# Patient Record
Sex: FEMALE | Race: WHITE | NOT HISPANIC OR LATINO
[De-identification: names, ages, dates, MRNs, and addresses within clinical notes are randomized per-mention and may not be internally consistent; named-entity substitution may affect disease eponyms.]

---

## 2020-09-22 ENCOUNTER — APPOINTMENT (OUTPATIENT)
Dept: SURGERY | Facility: CLINIC | Age: 55
End: 2020-09-22

## 2020-09-22 ENCOUNTER — APPOINTMENT (OUTPATIENT)
Dept: SURGERY | Facility: CLINIC | Age: 55
End: 2020-09-22
Payer: COMMERCIAL

## 2020-09-22 VITALS
HEART RATE: 74 BPM | DIASTOLIC BLOOD PRESSURE: 90 MMHG | OXYGEN SATURATION: 99 % | TEMPERATURE: 97 F | SYSTOLIC BLOOD PRESSURE: 154 MMHG

## 2020-09-22 PROCEDURE — 27337 EXC THIGH/KNEE LES SC 3 CM/>: CPT

## 2020-09-22 NOTE — PROCEDURE
[FreeTextEntry1] : preop dx left knee mass 3 by 2 cm\par Post op dx same\par procedure Excision of left knee mass\par Surgeon Dr lai\par EBL 10 cc\par Condition stable\par \par   Pt was placed in the supine position and the left knee was properly cleaned and draped. A time out was done. Next the area over the mass was infiltrated with 1 per cent lidocaine. After adequate local anesthesia was administered a 4 cm incision was made over the mass. The mass was than dissected out with sharp dissection.  After it was removed, the area was irrigated with NS and hemostasis was obtained with electrocautery.  The deep subcutaneous tissue was reapproximated with 3-0 vicryl sutures and the skin incision was closed with a 4-0 Biosyn suture.  A DSD was than applied.  Pt tolerated the procedure and left the office in stable condition.

## 2020-09-29 ENCOUNTER — APPOINTMENT (OUTPATIENT)
Dept: SURGERY | Facility: CLINIC | Age: 55
End: 2020-09-29
Payer: COMMERCIAL

## 2020-09-29 VITALS — TEMPERATURE: 98 F

## 2020-09-29 DIAGNOSIS — R22.42 LOCALIZED SWELLING, MASS AND LUMP, LEFT LOWER LIMB: ICD-10-CM

## 2020-09-29 PROCEDURE — 99024 POSTOP FOLLOW-UP VISIT: CPT

## 2020-09-29 NOTE — PHYSICAL EXAM
[Normal Breath Sounds] : Normal breath sounds [Normal Heart Sounds] : normal heart sounds [Calm] : calm [de-identified] : well developed female in no acute distress [de-identified] : left knee incision clean and closed

## 2020-09-29 NOTE — HISTORY OF PRESENT ILLNESS
[de-identified] : s/p excision of left knee mass 9/22/20 [de-identified] : pt without complaints, no drainage.  No fevers, chills or night sweats.

## 2020-10-06 LAB — CORE LAB BIOPSY: NORMAL

## 2020-10-27 ENCOUNTER — APPOINTMENT (OUTPATIENT)
Dept: SURGERY | Facility: CLINIC | Age: 55
End: 2020-10-27

## 2022-07-15 ENCOUNTER — APPOINTMENT (OUTPATIENT)
Dept: ORTHOPEDIC SURGERY | Facility: CLINIC | Age: 57
End: 2022-07-15

## 2022-07-15 ENCOUNTER — TRANSCRIPTION ENCOUNTER (OUTPATIENT)
Age: 57
End: 2022-07-15

## 2022-07-15 VITALS — HEIGHT: 66 IN | WEIGHT: 130 LBS | BODY MASS INDEX: 20.89 KG/M2

## 2022-07-15 PROCEDURE — 99204 OFFICE O/P NEW MOD 45 MIN: CPT

## 2022-07-15 PROCEDURE — 99203 OFFICE O/P NEW LOW 30 MIN: CPT

## 2022-07-15 NOTE — IMAGING
[de-identified] : Constitutional: well developed and well nourished, able to communicate\par Cardiovascular: Peripheral vascular exam is grossly normal\par Neurologic: Alert and oriented, no acute distress.\par Skin: normal skin with no ulcers, rashes, or lesions\par Pulmonary: No respiratory distress, breathing comfortably on room air\par Lymphatics: No obvious lymphadenopathy or lymphedema in areas examined\par \par LOWER EXTREMITY/ RIGHT HIP EXAM\par Standing pelvic alignment: Symmetric with no Trendelenburg\par Atrophy: none\par Ecchymosis/swelling: none\par \par Range of Motion\par Hip: Flexion/extension/ER/IR     / EX 20/ ER 45/ IR 20 / AB 60 /AD 20\par Impingement with flexion adduction and internal rotation: POS\par Contracture: none\par Snapping hip: negative\par Greater trochanter: no tenderness\par \par Neurovascular\par Distal extremities: warm to touch\par Sensation to light touch: intact\par Muscle strength: 5/5\par \par IMAGING:\par 07/15/2022 Xrays of the Right hip 3v were taken demonstrating no signs of fractures, dislocations,or significant arthritis. \par MRI\par

## 2022-07-15 NOTE — HISTORY OF PRESENT ILLNESS
[Gradual] : gradual [7] : 7 [8] : 8 [Sharp] : sharp [Shooting] : shooting [Constant] : constant [Meds] : meds [Injection therapy] : injection therapy [Lying in bed] : lying in bed [de-identified] : Ms. VIV MCCLURE is a 57 year female that comes in today with a chief complaint of right hip pain with injection into the right hip CSI June 17 2022 with relief for short period of time. PT no relief.\par \par + groin pain. [] : no [FreeTextEntry1] : right hip  [FreeTextEntry3] : 5/21 /22 [FreeTextEntry5] : unknown injury . pt states shes been having pain. Is very active and could be from working out. Has gotten a steroid injection in hip gave relief but now pain is coming back.  [FreeTextEntry6] : spasms [FreeTextEntry7] : down to knee sometimes  [FreeTextEntry9] : Aleve and steroids  [de-identified] : couple weeks ago  [de-identified] : [facility]  [de-identified] : a month ago  [de-identified] : MRI and x rays

## 2022-07-15 NOTE — ASSESSMENT
[FreeTextEntry1] : 57 year F with right hip labral tear - imaging upload [facility] XR and MRI\par - follow up with Dr. Chao vs [facility] for hip arthroscopy discussion. If not a candidate than willl discuss anterior AIDAN

## 2022-07-18 ENCOUNTER — APPOINTMENT (OUTPATIENT)
Dept: ORTHOPEDIC SURGERY | Facility: CLINIC | Age: 57
End: 2022-07-18

## 2022-07-18 VITALS — WEIGHT: 130 LBS | HEIGHT: 66 IN | BODY MASS INDEX: 20.89 KG/M2

## 2022-07-18 PROCEDURE — 99214 OFFICE O/P EST MOD 30 MIN: CPT

## 2022-07-18 PROCEDURE — 99213 OFFICE O/P EST LOW 20 MIN: CPT

## 2022-07-19 NOTE — HISTORY OF PRESENT ILLNESS
[Right Leg] : right leg [9] : 9 [0] : 0 [Sharp] : sharp [Occasional] : occasional [Heat] : heat [de-identified] : This is Ms. VIV MCCLURE  a 57 year old female who comes in today complaining of right hip pain over time.  Patient is very active with sports and exercise, biking. notes that she has pain since memorial day after biking. pain in the right groin area. notes that she saw dr. ceja and had PT and CSI to hip joint with good temporary relief. notes pain is significant in groin. \par  [] : no [de-identified] : crossing legs, sitting to standing [de-identified] : dr granados [de-identified] : xray/mri

## 2022-07-19 NOTE — DISCUSSION/SUMMARY
[de-identified] : discussed and recommended hip arthroscopy at this time for labral repair. discussed that her clinical picture does not justify AIDAN at this time. discussed if arthritis is more signifcant than apparent on MRI or worsens over time, further management with AIDAN may be required in the future espeically given her age. however, she appears physiologically young and discussed RBA/rehab/recovery of each and indicated hip arthroscopy should be the superior option. she is doing HEP with  at this time. she will contact if she wishes to proceed with arthroscopic fixation of labrum. also discused debridement vs fixation, and indicated we would prefer repair.\par \par The patient was advised of the diagnosis.  The natural history of the pathology was explained in full. All questions were answered.  The risks and benefits of conservative and interventional treatment alternatives were explained to the patient\par \par

## 2022-07-19 NOTE — DATA REVIEWED
[MRI] : MRI [Right] : of the right [Hip] : hip [I independently reviewed and interpreted images and report] : I independently reviewed and interpreted images and report [FreeTextEntry1] : significant anterior/superior labral tear with likely unstable flap. small effusion. mild degenerative changes

## 2022-07-19 NOTE — IMAGING
[de-identified] : Right hip exam: \par \par General: no distress, no ligamentous laxity\par Skin: no significant pertinent finding\par Inspection: no deformity, no swelling, no gross limb length discrepancy\par ROM: \par    Flexion: 90\par    ER: 40\par    IR: 15\par Tenderness: \par    Groin tenderness: +\par    Greater trochanteric tenderness: (-)\par    Buttock tenderness: (-)\par    IT Band tenderness: (-)\par    Anterior thigh tenderness: (-)\par    ASIS tenderness: (-)\par    Ischial tuberosity: (-)\par    Hamstring muscle tenderness: (-)\par Additional tests: \par    FADIR: +\par    DEILSON: +groin pain\par    Resisted hip flexion pain: +\par    Apprehension (external rotation/extension): +pain\par    Posterior pain with forced hip flexion and knee extension: (-)\par    Tight hamstrings: (-)\par    Log roll: (-)\par    Axial load: (-)\par Strength: 5/5 IP/Q/H/TA/GS/EHL\par Neuro: In tact to light touch throughout, DTR's wnl\par Vascularity: Extremity warm and well perfused\par Gait: mildly antalgic\par \par

## 2022-07-26 ENCOUNTER — APPOINTMENT (OUTPATIENT)
Dept: ORTHOPEDIC SURGERY | Facility: CLINIC | Age: 57
End: 2022-07-26

## 2022-07-26 VITALS — BODY MASS INDEX: 20.89 KG/M2 | WEIGHT: 130 LBS | HEIGHT: 66 IN

## 2022-07-26 DIAGNOSIS — Z78.9 OTHER SPECIFIED HEALTH STATUS: ICD-10-CM

## 2022-07-26 PROCEDURE — 99214 OFFICE O/P EST MOD 30 MIN: CPT

## 2022-07-26 NOTE — ASSESSMENT
[FreeTextEntry1] : 57 year F with right hip labral tear - imaging upload [facility] XR and MRI\par - follow up with Dr. Chao vs [facility] for hip arthroscopy discussion. If not a candidate than willl discuss anterior AIDAN\par \par 7/26/22: Patient discussed the hip arthroscopy with Dr. Chao. They decided that the hip arthroscopy was not for them. Discussed the right AIDAN procedure today. Went over the recovery timeline and expected outcomes following surgery. XR of the hip next time she comes back

## 2022-07-26 NOTE — IMAGING
[de-identified] : Constitutional: well developed and well nourished, able to communicate\par Cardiovascular: Peripheral vascular exam is grossly normal\par Neurologic: Alert and oriented, no acute distress.\par Skin: normal skin with no ulcers, rashes, or lesions\par Pulmonary: No respiratory distress, breathing comfortably on room air\par Lymphatics: No obvious lymphadenopathy or lymphedema in areas examined\par \par LOWER EXTREMITY/ RIGHT HIP EXAM\par Standing pelvic alignment: Symmetric with no Trendelenburg\par Atrophy: none\par Ecchymosis/swelling: none\par \par Range of Motion\par Hip: Flexion/extension/ER/IR     / EX 20/ ER 45/ IR 20 / AB 60 /AD 20\par Impingement with flexion adduction and internal rotation: POS\par Contracture: none\par Snapping hip: negative\par Greater trochanter: no tenderness\par \par Neurovascular\par Distal extremities: warm to touch\par Sensation to light touch: intact\par Muscle strength: 5/5\par \par IMAGING:\par 07/15/2022 Xrays of the Right hip 3v were taken demonstrating no signs of fractures, dislocations,or significant arthritis. \par MRI\par

## 2022-07-26 NOTE — HISTORY OF PRESENT ILLNESS
[Gradual] : gradual [Sharp] : sharp [Shooting] : shooting [Constant] : constant [Meds] : meds [Injection therapy] : injection therapy [Lying in bed] : lying in bed [9] : 9 [5] : 5 [Localized] : localized [Sleep] : sleep [Rest] : rest [Exercising] : exercising [Stairs] : stairs [de-identified] : 7/15/22: Ms. VIV MCCLURE is a 57 year female that comes in today with a chief complaint of right hip pain with injection into the right hip CSI June 17 2022 with relief for short period of time. PT no relief.\par \par + groin pain. [] : Post Surgical Visit: no [FreeTextEntry1] : right hip  [FreeTextEntry3] : 5/21/22 [FreeTextEntry6] : spasms occasionally [FreeTextEntry7] : down to knee/glute sometimes  [FreeTextEntry9] : Aleve and steroids  [de-identified] : couple weeks ago  [de-identified] : [facility]  [de-identified] : a month ago  [de-identified] : MRI and x rays

## 2022-08-03 ENCOUNTER — APPOINTMENT (OUTPATIENT)
Dept: ORTHOPEDIC SURGERY | Facility: CLINIC | Age: 57
End: 2022-08-03

## 2022-08-10 ENCOUNTER — NON-APPOINTMENT (OUTPATIENT)
Age: 57
End: 2022-08-10

## 2022-08-13 ENCOUNTER — APPOINTMENT (OUTPATIENT)
Dept: ORTHOPEDIC SURGERY | Facility: CLINIC | Age: 57
End: 2022-08-13

## 2022-08-13 ENCOUNTER — NON-APPOINTMENT (OUTPATIENT)
Age: 57
End: 2022-08-13

## 2022-08-13 VITALS
HEART RATE: 52 BPM | HEIGHT: 66 IN | WEIGHT: 128 LBS | SYSTOLIC BLOOD PRESSURE: 122 MMHG | DIASTOLIC BLOOD PRESSURE: 74 MMHG | BODY MASS INDEX: 20.57 KG/M2

## 2022-08-13 DIAGNOSIS — S73.191A OTHER SPRAIN OF RIGHT HIP, INITIAL ENCOUNTER: ICD-10-CM

## 2022-08-13 DIAGNOSIS — M94.251 CHONDROMALACIA, RIGHT HIP: ICD-10-CM

## 2022-08-13 DIAGNOSIS — M25.551 PAIN IN RIGHT HIP: ICD-10-CM

## 2022-08-13 PROCEDURE — 73502 X-RAY EXAM HIP UNI 2-3 VIEWS: CPT

## 2022-08-13 PROCEDURE — 99214 OFFICE O/P EST MOD 30 MIN: CPT

## 2022-08-13 NOTE — HISTORY OF PRESENT ILLNESS
[de-identified] : Patient presents today with  for initial evaluation of left hip pain.  The patient reports of increasing right hip pain.  She has had the pain now for approximately 3 months.  She was previously seen by an orthopedist in the [facility] and was recommended x-ray and MRI.  The patient was found to have a substantial labral tear.  She was offered an intra-articular corticosteroid and anesthetic injection.  She reports that she had good relief from her hip pain.  Subsequently, the pain did return.  She has had a few additional orthopedic evaluations.  She has been recommended both a labral repair surgery as well as a hip replacement surgery.  She is here today to discuss management options.  She reports of increasing difficulties with activities such as sports, ambulation and sleep.  She states that her quality of life is not what it once was because of the increasing right hip pain.  Hip pain is also now started to cause some lower back pain.  This does not radiate past the knee area.  No past hip surgery or trauma is reported.  No use of a cane or a walker.\par \par Review of Systems-\par Constitutional: No fever or chills. \par Cardiovascular: No orthopnea or chest pain\par Pulmonary: No shortness of breath. \par GI: No nausea or vomiting or abdominal pain.\par Musculoskeletal: see HPI \par Psychiatric: No anxiety and depression.

## 2022-08-13 NOTE — END OF VISIT
[FreeTextEntry3] : I, Dr. Youssef, personally performed the evaluation and management services for this established patient who presented today with a new problem/exacerbation of an existing condition. That E/M includes conducting the examination, assessing all new/exacerbated conditions, and establishing a new plan of care. Today, MY ACP was here to observe my evaluation and management services of this new problem/exacerbated condition to be followed going forward.\par

## 2022-08-13 NOTE — PHYSICAL EXAM
[de-identified] : The patient is conversive and in no apparent distress. The patient is alert and oriented to person, place, and time. Affect and mood appear normal. The head is normocephalic and atraumatic. Skin shows normal turgor with no evidence of eczema or psoriasis. No respiratory distress noted. Sensation grossly intact. MUSCULOSKELETAL:   SEE BELOW\par \par Right hip exam demonstrates equal leg lengths.  There is moderate difficulty mounting the exam table.  There is mild hesitancy and noted stiffness with passive range of motion.  The patient is unable to come into a figure-of-four without noted pain and stiffness.  Hip flexion is limited to approximately 50 degrees.  Hip abduction is limited to approximately 25 degrees.  Hip abduction is limited to 10 degrees.  External rotation of 30 degrees.  Internal rotation of neutral.  there is guarding with full hip motions.  There is mild pain against resisted straight leg raise.  Normal sensation to light touch distally.  No venous stasis or ulceration.  2+ DP pulse. [de-identified] : Outpatient imaging both plain film and MRI was reviewed.\par \par Pelvis and hip x-ray demonstrates no lytic lesions, significant degenerative changes, joint space narrowing or large osteophyte formation.\par \par MRI from June 2022 demonstrates labral tearing.  FEw small femoral head cystic changes noted. No avascular necrosis.  No large focal or full-thickness cartilage loss is appreciated.\par \par Pelvis and right hip x-rays taken today were reviewed.  No retained hardware.

## 2022-08-13 NOTE — DISCUSSION/SUMMARY
[de-identified] : 30 minutes was spent reviewing the x-rays as well as discussing with the patient their clinical presentation, diagnosis and providing education.  All imaging was reviewed with the patient.  The patient does have cystic changes consistent with degenerative osteoarthritis.  The patient has had previous intra-articular injection of the hip with successful relief of pain for a number of weeks.  However, she is not symptomatic.  She is showing signs of stiffness.  An extensive discussion was held regarding surgical options.  At this time because of the cystic changes, the stiffness and the mild degenerative changes noted on the MRI, the patient is recommended for surgical management of a total hip replacement.  She is a suitable candidate for the anterior approach.  She is recommended a right anterior total hip arthroplasty.  Patient will need medical clearance prior to surgery.\par \par The patient is a 57 year old individual with end stage arthritis of their right hip joint. Based upon the patient's continued symptoms and failure to respond to conservative treatment I have recommended a right anterior total hip arthroplasty for this patient. A long discussion took place with the patient describing what a total joint replacement is and what the procedure would entail. A hip model, similar to the implant that will be used during the operation, was utilized to demonstrate and to discuss the various bearing surfaces of the implants. The hospitalization and post-operative care and rehabilitation were also discussed. The use of perioperative antibiotics and DVT prophylaxis were discussed. The risk, benefits and alternatives to a surgical intervention were discussed at length with the patient. The patient was also advised of risks related to the medical comorbidities and elevated body mass index (BMI).  A lengthy discussion took place to review the most common complications including but not limited to: deep vein thrombosis, pulmonary embolus, heart attack, stroke, infection, wound breakdown, numbness, damage to nerves, tendon, muscles, arteries or other blood vessels, death and other possible complications from anesthesia. The patient was told that we will take steps to minimize these risks by using sterile technique, antibiotics and DVT prophylaxis when appropriate and follow the patient postoperatively in the office setting to monitor progress. The possibility of recurrent pain, no improvement in pain and actual worsening of pain were also discussed with the patient.\par The discharge plan of care focused on the patient going home following surgery.  The patient was encouraged to make the necessary arrangements to have someone stay with them when they are discharged home.  Following discharge, a home care nurse will visit the patient.  The home care nurse will open your home care case and request home physical therapy services.  Home physical therapy will commence following discharge provided it is appropriate and covered by the health insurance benefit plan. \par The benefits of surgery were discussed with the patient including the potential for improving his/her current clinical condition through operative intervention. Alternatives to surgical intervention including continued conservative management were also discussed in detail. All questions were answered to the satisfaction of the patient. The treatment plan of care, as well as a model of a total hip equivalent to the one that will be used for their total joint replacement, was shared with the patient.  The patient agreed to the plan of care as well as the use of implants in their total joint replacement.\par

## 2022-08-26 ENCOUNTER — APPOINTMENT (OUTPATIENT)
Dept: ORTHOPEDIC SURGERY | Facility: CLINIC | Age: 57
End: 2022-08-26

## 2022-09-30 ENCOUNTER — APPOINTMENT (OUTPATIENT)
Dept: ORTHOPEDIC SURGERY | Facility: CLINIC | Age: 57
End: 2022-09-30

## 2022-10-11 ENCOUNTER — OUTPATIENT (OUTPATIENT)
Dept: OUTPATIENT SERVICES | Facility: HOSPITAL | Age: 57
LOS: 1 days | End: 2022-10-11
Payer: COMMERCIAL

## 2022-10-11 VITALS
RESPIRATION RATE: 12 BRPM | WEIGHT: 132.5 LBS | OXYGEN SATURATION: 99 % | HEART RATE: 56 BPM | TEMPERATURE: 98 F | HEIGHT: 65.5 IN | DIASTOLIC BLOOD PRESSURE: 80 MMHG | SYSTOLIC BLOOD PRESSURE: 123 MMHG

## 2022-10-11 DIAGNOSIS — M94.251 CHONDROMALACIA, RIGHT HIP: ICD-10-CM

## 2022-10-11 DIAGNOSIS — Z98.890 OTHER SPECIFIED POSTPROCEDURAL STATES: Chronic | ICD-10-CM

## 2022-10-11 DIAGNOSIS — Z90.89 ACQUIRED ABSENCE OF OTHER ORGANS: Chronic | ICD-10-CM

## 2022-10-11 DIAGNOSIS — Z01.818 ENCOUNTER FOR OTHER PREPROCEDURAL EXAMINATION: ICD-10-CM

## 2022-10-11 DIAGNOSIS — M16.11 UNILATERAL PRIMARY OSTEOARTHRITIS, RIGHT HIP: ICD-10-CM

## 2022-10-11 LAB
A1C WITH ESTIMATED AVERAGE GLUCOSE RESULT: 5.4 % — SIGNIFICANT CHANGE UP (ref 4–5.6)
ALBUMIN SERPL ELPH-MCNC: 4.3 G/DL — SIGNIFICANT CHANGE UP (ref 3.3–5)
ALP SERPL-CCNC: 103 U/L — SIGNIFICANT CHANGE UP (ref 30–120)
ALT FLD-CCNC: 27 U/L DA — SIGNIFICANT CHANGE UP (ref 10–60)
ANION GAP SERPL CALC-SCNC: 4 MMOL/L — LOW (ref 5–17)
APTT BLD: 34.4 SEC — SIGNIFICANT CHANGE UP (ref 27.5–35.5)
AST SERPL-CCNC: 24 U/L — SIGNIFICANT CHANGE UP (ref 10–40)
BILIRUB SERPL-MCNC: 0.3 MG/DL — SIGNIFICANT CHANGE UP (ref 0.2–1.2)
BLD GP AB SCN SERPL QL: SIGNIFICANT CHANGE UP
BUN SERPL-MCNC: 17 MG/DL — SIGNIFICANT CHANGE UP (ref 7–23)
CALCIUM SERPL-MCNC: 9.5 MG/DL — SIGNIFICANT CHANGE UP (ref 8.4–10.5)
CHLORIDE SERPL-SCNC: 104 MMOL/L — SIGNIFICANT CHANGE UP (ref 96–108)
CO2 SERPL-SCNC: 32 MMOL/L — HIGH (ref 22–31)
CREAT SERPL-MCNC: 0.89 MG/DL — SIGNIFICANT CHANGE UP (ref 0.5–1.3)
EGFR: 75 ML/MIN/1.73M2 — SIGNIFICANT CHANGE UP
ESTIMATED AVERAGE GLUCOSE: 108 MG/DL — SIGNIFICANT CHANGE UP (ref 68–114)
GLUCOSE SERPL-MCNC: 85 MG/DL — SIGNIFICANT CHANGE UP (ref 70–99)
HCT VFR BLD CALC: 43.2 % — SIGNIFICANT CHANGE UP (ref 34.5–45)
HGB BLD-MCNC: 14.1 G/DL — SIGNIFICANT CHANGE UP (ref 11.5–15.5)
INR BLD: 1.03 RATIO — SIGNIFICANT CHANGE UP (ref 0.88–1.16)
MCHC RBC-ENTMCNC: 31.1 PG — SIGNIFICANT CHANGE UP (ref 27–34)
MCHC RBC-ENTMCNC: 32.6 GM/DL — SIGNIFICANT CHANGE UP (ref 32–36)
MCV RBC AUTO: 95.2 FL — SIGNIFICANT CHANGE UP (ref 80–100)
MRSA PCR RESULT.: SIGNIFICANT CHANGE UP
NRBC # BLD: 0 /100 WBCS — SIGNIFICANT CHANGE UP (ref 0–0)
PLATELET # BLD AUTO: 204 K/UL — SIGNIFICANT CHANGE UP (ref 150–400)
POTASSIUM SERPL-MCNC: 4.6 MMOL/L — SIGNIFICANT CHANGE UP (ref 3.5–5.3)
POTASSIUM SERPL-SCNC: 4.6 MMOL/L — SIGNIFICANT CHANGE UP (ref 3.5–5.3)
PROT SERPL-MCNC: 7.4 G/DL — SIGNIFICANT CHANGE UP (ref 6–8.3)
PROTHROM AB SERPL-ACNC: 11.9 SEC — SIGNIFICANT CHANGE UP (ref 10.5–13.4)
RBC # BLD: 4.54 M/UL — SIGNIFICANT CHANGE UP (ref 3.8–5.2)
RBC # FLD: 13.2 % — SIGNIFICANT CHANGE UP (ref 10.3–14.5)
S AUREUS DNA NOSE QL NAA+PROBE: DETECTED
SODIUM SERPL-SCNC: 140 MMOL/L — SIGNIFICANT CHANGE UP (ref 135–145)
WBC # BLD: 5.71 K/UL — SIGNIFICANT CHANGE UP (ref 3.8–10.5)
WBC # FLD AUTO: 5.71 K/UL — SIGNIFICANT CHANGE UP (ref 3.8–10.5)

## 2022-10-11 PROCEDURE — G0463: CPT

## 2022-10-11 PROCEDURE — 93005 ELECTROCARDIOGRAM TRACING: CPT

## 2022-10-11 PROCEDURE — 93010 ELECTROCARDIOGRAM REPORT: CPT

## 2022-10-11 NOTE — H&P PST ADULT - URINARY CATHETER
Caller would like to discuss an/a Appointment Depo. Writer advised caller of callback within 24-72 hours.    Patient Name: Rosas Dong  Caller Name: same   Name of Facility:   Callback Number: [phone]  Best Availability: any time   Fax Number:   Additional Info: patient states that she would like to have her depo earlier.  Please contact patient.    Did you confirm the message with the caller?: yes    Thank you,  Shlipi Lemos     no

## 2022-10-11 NOTE — H&P PST ADULT - NSICDXPASTSURGICALHX_GEN_ALL_CORE_FT
PAST SURGICAL HISTORY:  H/O arthroscopy of right knee 2020, MCL repair    History of repair of ACL left 2013    History of tonsillectomy age 30

## 2022-10-11 NOTE — H&P PST ADULT - MUSCULOSKELETAL
details… right hip/no joint swelling/no joint erythema/no joint warmth/no calf tenderness/decreased ROM/decreased ROM due to pain/strength 5/5 bilateral upper extremities/decreased strength

## 2022-10-11 NOTE — H&P PST ADULT - NSICDXFAMILYHX_GEN_ALL_CORE_FT
FAMILY HISTORY:  Father  Still living? No  Family history of type 2 diabetes mellitus, Age at diagnosis: Age Unknown

## 2022-10-11 NOTE — H&P PST ADULT - HISTORY OF PRESENT ILLNESS
58 yo female presents with c/o right hip and groin pain with radiation to the right buttock since Memorial Day weekend after running and biking. Denies obvious injury. She was treated with 1 cortisone injection with 1 week of pain relief. She also had PT which worsened the pain. She now reports 6/10 pain at rest and increased to 8/10 with activity and prolonged sitting. Pain mildly relieved with icy hot and heat.

## 2022-10-11 NOTE — H&P PST ADULT - PROBLEM SELECTOR PLAN 1
right anterior THR. medical clearance requested. instructed to take levothyroxine AM of surgery with sips of water and instructed to stop MVI 1 week prior to surgery. ERAS and surgical wash instructions reviewed and verbalized understanding. covid PCR appt. confirmed for 10/28/22 at 1030

## 2022-10-11 NOTE — H&P PST ADULT - NSANTHOSAYNRD_GEN_A_CORE
No. LOUIS screening performed.  STOP BANG Legend: 0-2 = LOW Risk; 3-4 = INTERMEDIATE Risk; 5-8 = HIGH Risk neck 12.5 inches/No. LOUIS screening performed.  STOP BANG Legend: 0-2 = LOW Risk; 3-4 = INTERMEDIATE Risk; 5-8 = HIGH Risk

## 2022-10-11 NOTE — H&P PST ADULT - NSICDXPASTMEDICALHX_GEN_ALL_CORE_FT
PAST MEDICAL HISTORY:  COVID-19 vaccine series completed     GERD (gastroesophageal reflux disease)     History of COVID-19 March 2021, flu-like symptoms and no hospitalization    History of hyperthyroidism 2002, treated with radioactive iodine    Osteoarthritis     Peptic ulcer 2009

## 2022-10-12 RX ORDER — MUPIROCIN 20 MG/G
1 OINTMENT TOPICAL
Qty: 1 | Refills: 0
Start: 2022-10-12 | End: 2022-10-16

## 2022-10-12 NOTE — PROGRESS NOTE ADULT - SUBJECTIVE AND OBJECTIVE BOX
Nose culture positive for MSSA. mupirocin ointment 2% escribed and sent to patient's pharmacy and to be used twice a day for 5 consecutive days. Treatment protocol reviewed with patient, questions answered and verbalized understanding.

## 2022-10-26 PROBLEM — Z92.29 PERSONAL HISTORY OF OTHER DRUG THERAPY: Chronic | Status: ACTIVE | Noted: 2022-10-11

## 2022-10-26 PROBLEM — Z86.39 PERSONAL HISTORY OF OTHER ENDOCRINE, NUTRITIONAL AND METABOLIC DISEASE: Chronic | Status: ACTIVE | Noted: 2022-10-11

## 2022-10-26 PROBLEM — M19.90 UNSPECIFIED OSTEOARTHRITIS, UNSPECIFIED SITE: Chronic | Status: ACTIVE | Noted: 2022-10-11

## 2022-10-26 PROBLEM — Z86.16 PERSONAL HISTORY OF COVID-19: Chronic | Status: ACTIVE | Noted: 2022-10-11

## 2022-10-26 PROBLEM — K21.9 GASTRO-ESOPHAGEAL REFLUX DISEASE WITHOUT ESOPHAGITIS: Chronic | Status: ACTIVE | Noted: 2022-10-11

## 2022-10-26 PROBLEM — K27.9 PEPTIC ULCER, SITE UNSPECIFIED, UNSPECIFIED AS ACUTE OR CHRONIC, WITHOUT HEMORRHAGE OR PERFORATION: Chronic | Status: ACTIVE | Noted: 2022-10-11

## 2022-10-28 ENCOUNTER — OUTPATIENT (OUTPATIENT)
Dept: OUTPATIENT SERVICES | Facility: HOSPITAL | Age: 57
LOS: 1 days | End: 2022-10-28
Payer: COMMERCIAL

## 2022-10-28 DIAGNOSIS — Z01.818 ENCOUNTER FOR OTHER PREPROCEDURAL EXAMINATION: ICD-10-CM

## 2022-10-28 DIAGNOSIS — Z90.89 ACQUIRED ABSENCE OF OTHER ORGANS: Chronic | ICD-10-CM

## 2022-10-28 DIAGNOSIS — M94.251 CHONDROMALACIA, RIGHT HIP: ICD-10-CM

## 2022-10-28 DIAGNOSIS — Z20.828 CONTACT WITH AND (SUSPECTED) EXPOSURE TO OTHER VIRAL COMMUNICABLE DISEASES: ICD-10-CM

## 2022-10-28 DIAGNOSIS — Z98.890 OTHER SPECIFIED POSTPROCEDURAL STATES: Chronic | ICD-10-CM

## 2022-10-28 LAB — SARS-COV-2 RNA SPEC QL NAA+PROBE: SIGNIFICANT CHANGE UP

## 2022-10-28 PROCEDURE — U0005: CPT

## 2022-10-28 PROCEDURE — U0003: CPT

## 2022-10-30 ENCOUNTER — FORM ENCOUNTER (OUTPATIENT)
Age: 57
End: 2022-10-30

## 2022-10-31 ENCOUNTER — OUTPATIENT (OUTPATIENT)
Dept: OUTPATIENT SERVICES | Facility: HOSPITAL | Age: 57
LOS: 1 days | End: 2022-10-31
Payer: COMMERCIAL

## 2022-10-31 ENCOUNTER — RESULT REVIEW (OUTPATIENT)
Age: 57
End: 2022-10-31

## 2022-10-31 ENCOUNTER — TRANSCRIPTION ENCOUNTER (OUTPATIENT)
Age: 57
End: 2022-10-31

## 2022-10-31 ENCOUNTER — APPOINTMENT (OUTPATIENT)
Dept: ORTHOPEDIC SURGERY | Facility: HOSPITAL | Age: 57
End: 2022-10-31

## 2022-10-31 VITALS
DIASTOLIC BLOOD PRESSURE: 80 MMHG | OXYGEN SATURATION: 100 % | RESPIRATION RATE: 20 BRPM | HEIGHT: 66 IN | WEIGHT: 131.4 LBS | SYSTOLIC BLOOD PRESSURE: 118 MMHG | TEMPERATURE: 98 F | HEART RATE: 62 BPM

## 2022-10-31 VITALS
HEART RATE: 73 BPM | OXYGEN SATURATION: 95 % | TEMPERATURE: 99 F | DIASTOLIC BLOOD PRESSURE: 69 MMHG | SYSTOLIC BLOOD PRESSURE: 102 MMHG | RESPIRATION RATE: 18 BRPM

## 2022-10-31 DIAGNOSIS — Z98.890 OTHER SPECIFIED POSTPROCEDURAL STATES: Chronic | ICD-10-CM

## 2022-10-31 DIAGNOSIS — Z01.818 ENCOUNTER FOR OTHER PREPROCEDURAL EXAMINATION: ICD-10-CM

## 2022-10-31 DIAGNOSIS — M94.251 CHONDROMALACIA, RIGHT HIP: ICD-10-CM

## 2022-10-31 DIAGNOSIS — Z90.89 ACQUIRED ABSENCE OF OTHER ORGANS: Chronic | ICD-10-CM

## 2022-10-31 LAB — ABO RH CONFIRMATION: SIGNIFICANT CHANGE UP

## 2022-10-31 PROCEDURE — 99204 OFFICE O/P NEW MOD 45 MIN: CPT

## 2022-10-31 PROCEDURE — 27130 TOTAL HIP ARTHROPLASTY: CPT | Mod: RT

## 2022-10-31 PROCEDURE — 88305 TISSUE EXAM BY PATHOLOGIST: CPT | Mod: 26

## 2022-10-31 PROCEDURE — 97161 PT EVAL LOW COMPLEX 20 MIN: CPT

## 2022-10-31 PROCEDURE — 36415 COLL VENOUS BLD VENIPUNCTURE: CPT

## 2022-10-31 PROCEDURE — 76000 FLUOROSCOPY <1 HR PHYS/QHP: CPT

## 2022-10-31 PROCEDURE — 88311 DECALCIFY TISSUE: CPT | Mod: 26

## 2022-10-31 PROCEDURE — 97530 THERAPEUTIC ACTIVITIES: CPT

## 2022-10-31 PROCEDURE — C1713: CPT

## 2022-10-31 PROCEDURE — 88305 TISSUE EXAM BY PATHOLOGIST: CPT

## 2022-10-31 PROCEDURE — C1776: CPT

## 2022-10-31 PROCEDURE — 88311 DECALCIFY TISSUE: CPT

## 2022-10-31 PROCEDURE — 97116 GAIT TRAINING THERAPY: CPT

## 2022-10-31 DEVICE — SLEEVE BIOLOX DELTA OPTION SZ -3: Type: IMPLANTABLE DEVICE | Status: FUNCTIONAL

## 2022-10-31 DEVICE — LINER ACET EMPOWR HXE PLUS NEUT 36MM ALPHA F: Type: IMPLANTABLE DEVICE | Status: FUNCTIONAL

## 2022-10-31 DEVICE — BONE WAX 2.5GM: Type: IMPLANTABLE DEVICE | Status: FUNCTIONAL

## 2022-10-31 DEVICE — SCREW ACET SZ 6.5X30MM: Type: IMPLANTABLE DEVICE | Status: FUNCTIONAL

## 2022-10-31 DEVICE — CUP ACET EMPOWR CLUSTER 52MM ALPHA F: Type: IMPLANTABLE DEVICE | Status: FUNCTIONAL

## 2022-10-31 DEVICE — STEM FEM P2 LAT OFFSET SZ 9: Type: IMPLANTABLE DEVICE | Status: FUNCTIONAL

## 2022-10-31 DEVICE — HEAD FEM OPTION CERAMIC DELTA 36MM: Type: IMPLANTABLE DEVICE | Status: FUNCTIONAL

## 2022-10-31 DEVICE — KWIRE THREADED 4.8MM 6/PK: Type: IMPLANTABLE DEVICE | Status: FUNCTIONAL

## 2022-10-31 RX ORDER — HYDROMORPHONE HYDROCHLORIDE 2 MG/ML
1 INJECTION INTRAMUSCULAR; INTRAVENOUS; SUBCUTANEOUS
Refills: 0 | Status: DISCONTINUED | OUTPATIENT
Start: 2022-10-31 | End: 2022-10-31

## 2022-10-31 RX ORDER — TRANEXAMIC ACID 100 MG/ML
1000 INJECTION, SOLUTION INTRAVENOUS ONCE
Refills: 0 | Status: COMPLETED | OUTPATIENT
Start: 2022-10-31 | End: 2022-10-31

## 2022-10-31 RX ORDER — SODIUM CHLORIDE 9 MG/ML
500 INJECTION INTRAMUSCULAR; INTRAVENOUS; SUBCUTANEOUS ONCE
Refills: 0 | Status: COMPLETED | OUTPATIENT
Start: 2022-10-31 | End: 2022-10-31

## 2022-10-31 RX ORDER — HYDROMORPHONE HYDROCHLORIDE 2 MG/ML
0.5 INJECTION INTRAMUSCULAR; INTRAVENOUS; SUBCUTANEOUS
Refills: 0 | Status: DISCONTINUED | OUTPATIENT
Start: 2022-10-31 | End: 2022-10-31

## 2022-10-31 RX ORDER — LEVOTHYROXINE SODIUM 125 MCG
100 TABLET ORAL DAILY
Refills: 0 | Status: DISCONTINUED | OUTPATIENT
Start: 2022-10-31 | End: 2022-11-14

## 2022-10-31 RX ORDER — SENNA PLUS 8.6 MG/1
2 TABLET ORAL AT BEDTIME
Refills: 0 | Status: DISCONTINUED | OUTPATIENT
Start: 2022-10-31 | End: 2022-11-14

## 2022-10-31 RX ORDER — ACETAMINOPHEN 500 MG
1000 TABLET ORAL EVERY 8 HOURS
Refills: 0 | Status: DISCONTINUED | OUTPATIENT
Start: 2022-10-31 | End: 2022-11-14

## 2022-10-31 RX ORDER — MAGNESIUM HYDROXIDE 400 MG/1
30 TABLET, CHEWABLE ORAL DAILY
Refills: 0 | Status: DISCONTINUED | OUTPATIENT
Start: 2022-10-31 | End: 2022-11-14

## 2022-10-31 RX ORDER — ACETAMINOPHEN 500 MG
1000 TABLET ORAL ONCE
Refills: 0 | Status: COMPLETED | OUTPATIENT
Start: 2022-10-31 | End: 2022-10-31

## 2022-10-31 RX ORDER — DEXAMETHASONE 0.5 MG/5ML
8 ELIXIR ORAL ONCE
Refills: 0 | Status: DISCONTINUED | OUTPATIENT
Start: 2022-11-01 | End: 2022-11-14

## 2022-10-31 RX ORDER — SODIUM CHLORIDE 9 MG/ML
1000 INJECTION, SOLUTION INTRAVENOUS
Refills: 0 | Status: DISCONTINUED | OUTPATIENT
Start: 2022-10-31 | End: 2022-10-31

## 2022-10-31 RX ORDER — POLYETHYLENE GLYCOL 3350 17 G/17G
17 POWDER, FOR SOLUTION ORAL AT BEDTIME
Refills: 0 | Status: DISCONTINUED | OUTPATIENT
Start: 2022-10-31 | End: 2022-11-14

## 2022-10-31 RX ORDER — OMEPRAZOLE 10 MG/1
1 CAPSULE, DELAYED RELEASE ORAL
Qty: 0 | Refills: 0 | DISCHARGE

## 2022-10-31 RX ORDER — ASPIRIN/CALCIUM CARB/MAGNESIUM 324 MG
1 TABLET ORAL
Qty: 56 | Refills: 0
Start: 2022-10-31 | End: 2022-11-27

## 2022-10-31 RX ORDER — LEVOTHYROXINE SODIUM 125 MCG
1 TABLET ORAL
Qty: 0 | Refills: 0 | DISCHARGE

## 2022-10-31 RX ORDER — CHLORHEXIDINE GLUCONATE 213 G/1000ML
1 SOLUTION TOPICAL ONCE
Refills: 0 | Status: COMPLETED | OUTPATIENT
Start: 2022-10-31 | End: 2022-10-31

## 2022-10-31 RX ORDER — APREPITANT 80 MG/1
40 CAPSULE ORAL ONCE
Refills: 0 | Status: COMPLETED | OUTPATIENT
Start: 2022-10-31 | End: 2022-10-31

## 2022-10-31 RX ORDER — VANCOMYCIN HCL 1 G
1000 VIAL (EA) INTRAVENOUS ONCE
Refills: 0 | Status: COMPLETED | OUTPATIENT
Start: 2022-10-31 | End: 2022-10-31

## 2022-10-31 RX ORDER — MENTHOL AND METHYL SALICYLATE 10; 30 G/100G; G/100G
1 STICK TOPICAL
Qty: 0 | Refills: 0 | DISCHARGE

## 2022-10-31 RX ORDER — ASPIRIN/CALCIUM CARB/MAGNESIUM 324 MG
81 TABLET ORAL EVERY 12 HOURS
Refills: 0 | Status: DISCONTINUED | OUTPATIENT
Start: 2022-11-01 | End: 2022-11-14

## 2022-10-31 RX ORDER — MELOXICAM 15 MG/1
1 TABLET ORAL
Qty: 28 | Refills: 0
Start: 2022-10-31 | End: 2022-11-27

## 2022-10-31 RX ORDER — OXYCODONE HYDROCHLORIDE 5 MG/1
10 TABLET ORAL
Refills: 0 | Status: DISCONTINUED | OUTPATIENT
Start: 2022-10-31 | End: 2022-10-31

## 2022-10-31 RX ORDER — VANCOMYCIN HCL 1 G
1000 VIAL (EA) INTRAVENOUS ONCE
Refills: 0 | Status: DISCONTINUED | OUTPATIENT
Start: 2022-10-31 | End: 2022-10-31

## 2022-10-31 RX ORDER — ACETAMINOPHEN 500 MG
2 TABLET ORAL
Qty: 0 | Refills: 0 | DISCHARGE
Start: 2022-10-31 | End: 2022-11-14

## 2022-10-31 RX ORDER — ONDANSETRON 8 MG/1
4 TABLET, FILM COATED ORAL ONCE
Refills: 0 | Status: DISCONTINUED | OUTPATIENT
Start: 2022-10-31 | End: 2022-10-31

## 2022-10-31 RX ORDER — ONDANSETRON 8 MG/1
4 TABLET, FILM COATED ORAL EVERY 6 HOURS
Refills: 0 | Status: DISCONTINUED | OUTPATIENT
Start: 2022-10-31 | End: 2022-11-14

## 2022-10-31 RX ORDER — PANTOPRAZOLE SODIUM 20 MG/1
40 TABLET, DELAYED RELEASE ORAL
Refills: 0 | Status: DISCONTINUED | OUTPATIENT
Start: 2022-10-31 | End: 2022-11-14

## 2022-10-31 RX ORDER — OXYCODONE HYDROCHLORIDE 5 MG/1
5 TABLET ORAL
Refills: 0 | Status: DISCONTINUED | OUTPATIENT
Start: 2022-10-31 | End: 2022-10-31

## 2022-10-31 RX ORDER — SODIUM CHLORIDE 9 MG/ML
1000 INJECTION, SOLUTION INTRAVENOUS
Refills: 0 | Status: DISCONTINUED | OUTPATIENT
Start: 2022-10-31 | End: 2022-11-14

## 2022-10-31 RX ADMIN — SODIUM CHLORIDE 75 MILLILITER(S): 9 INJECTION, SOLUTION INTRAVENOUS at 10:43

## 2022-10-31 RX ADMIN — Medication 400 MILLIGRAM(S): at 13:57

## 2022-10-31 RX ADMIN — CHLORHEXIDINE GLUCONATE 1 APPLICATION(S): 213 SOLUTION TOPICAL at 06:57

## 2022-10-31 RX ADMIN — Medication 1000 MILLIGRAM(S): at 14:27

## 2022-10-31 RX ADMIN — Medication 250 MILLIGRAM(S): at 07:02

## 2022-10-31 RX ADMIN — SODIUM CHLORIDE 1000 MILLILITER(S): 9 INJECTION INTRAMUSCULAR; INTRAVENOUS; SUBCUTANEOUS at 13:57

## 2022-10-31 RX ADMIN — Medication 250 MILLIGRAM(S): at 17:03

## 2022-10-31 RX ADMIN — SODIUM CHLORIDE 1000 MILLILITER(S): 9 INJECTION INTRAMUSCULAR; INTRAVENOUS; SUBCUTANEOUS at 11:02

## 2022-10-31 RX ADMIN — APREPITANT 40 MILLIGRAM(S): 80 CAPSULE ORAL at 06:58

## 2022-10-31 NOTE — DISCHARGE NOTE NURSING/CASE MANAGEMENT/SOCIAL WORK - NSSCNAMETXT_GEN_ALL_CORE
[facility] (formerly [facility])   [address] [place] [address]

## 2022-10-31 NOTE — DISCHARGE NOTE PROVIDER - CARE PROVIDER_API CALL
Lorenza Youssef)  Orthopedics  [address], [address]  [place] [address]  Phone: [phone]  Fax: [phone]  Established Patient  Scheduled Appointment: 11/17/2022 09:30 AM

## 2022-10-31 NOTE — DISCHARGE NOTE NURSING/CASE MANAGEMENT/SOCIAL WORK - NSDCPEFALRISK_GEN_ALL_CORE
For information on Fall & Injury Prevention, visit: [email or web address] OR  [email or web address] OR  https://www.cdc.gov/steadi/patient.html

## 2022-10-31 NOTE — DISCHARGE NOTE PROVIDER - NSDCMRMEDTOKEN_GEN_ALL_CORE_FT
Aspirin Enteric Coated 81 mg oral delayed release tablet: 1 tab orally every 12 hours. Take aspirin 2 hours before Celebrex/Meloxicam.  Icy Hot topical ointment: Apply topically to affected area once a day (at bedtime), As Needed - for severe pain  levothyroxine 100 mcg (0.1 mg) oral tablet: 1 tab(s) orally 5 times a week alternating with every other week 6 times per week  meloxicam 15 mg oral tablet: 1 tab orally once a day. Take 2  hours after aspirin.   Multiple Vitamins oral tablet: 1 tab(s) orally once a day  mupirocin 2% topical ointment: 1 application in each nostril 2 times a day   omeprazole 40 mg oral delayed release capsule: 1 cap(s) orally once a day   acetaminophen 500 mg oral tablet: 2 tab(s) orally every 8 hours  Aspirin Enteric Coated 81 mg oral delayed release tablet: 1 tab orally every 12 hours. Take aspirin 2 hours before Celebrex/Meloxicam.  levothyroxine 100 mcg (0.1 mg) oral tablet: 1 tab(s) orally 5 times a week alternating with every other week 6 times per week  meloxicam 15 mg oral tablet: 1 tab orally once a day. Take 2  hours after aspirin.   Multiple Vitamins oral tablet: 1 tab(s) orally once a day  omeprazole 40 mg oral delayed release capsule: 1 cap(s) orally once a day

## 2022-10-31 NOTE — PROGRESS NOTE ADULT - SUBJECTIVE AND OBJECTIVE BOX
Orthopaedic Post Op Note    Procedure: Right Anterior THR  Surgeon: Lorenza Youssef     57y Female comfortable, without complaints. Ambulated with PT. Tolerating diet. Voiding. Has already cleared PT/OT to go home today. Reported pain score = 0  Denies N/V, CP, SOB, numbness/tingling of extremities.    PE:  Vital Signs Last 24 Hrs  T(C): 37.3 (31 Oct 2022 12:23), Max: 37.3 (31 Oct 2022 12:23)  T(F): 99.1 (31 Oct 2022 12:23), Max: 99.1 (31 Oct 2022 12:23)  HR: 73 (31 Oct 2022 12:23) (56 - 82)  BP: 105/70 (31 Oct 2022 12:23) (102/64 - 129/64)  RR: 18 (31 Oct 2022 12:23) (12 - 20)  SpO2: 95% (31 Oct 2022 12:23) (95% - 100%)    Parameters below as of 31 Oct 2022 12:05  Patient On (Oxygen Delivery Method): room air      General: Pt alert and oriented   Lungs: + BS CTA bilaterally  Heart: +S1 & S2 heard, RRR  Abd: + BS heard, soft, NT, ND  Right Hip [place] Dressing: C/D/I   Bilateral LEs:  Motor:   5/5 dorsiflexion, plantarflexion, EHL  Sensation intact to LT  2+ DP Pulses  SCDs in place    A/P: 57y Female stable POD#0 s/p Right Anterior THR   -  Acetaminophen, Meloxicam for Pain Control (patient refuses opiates for both inpatient and home)  - DVT ppx: Aspirin 81mg q 12 h   - Sally op IV abx: Vanco  - Meloxicam for HO ppx  - Anterior total hip precautions  - PT, OT per protocol  - F/U AM Labs  DCP = home today after 2nd vanco dose

## 2022-10-31 NOTE — ASU PREOP CHECKLIST - PATIENT'S PERSONAL PROPERTY GIVEN TO
Medical Assistant Note:  Lisa Johnston presents today for blood draw and EKG.    Patient seen by provider today: Yes: Dr Brizuela.   present during visit today: Not Applicable.    Concerns: No Concerns.    Procedure:  Labs drawn: .    Post Assessment:  Labs drawn without difficulty: Yes.    Discharge Plan:  Departure Mode: Ambulatory.    Face to Face Time: 15.    EKG done and called to cardiology for a non Stat read - ljt    Evelyne Baca, CMA           pacu/on unit/security/safe

## 2022-10-31 NOTE — DISCHARGE NOTE PROVIDER - NSDCFUSCHEDAPPT_GEN_ALL_CORE_FT
Lorenza Youssef  [facility]  [facility] [address] [facility]  Scheduled Appointment: 10/31/2022    Joya Keane  [facility]  ORTHOSURG [address]  Scheduled Appointment: 11/17/2022    Lorenza Youssef  [facility]  ORTHOSURG [address]  Scheduled Appointment: 12/27/2022     Joya Keane  [facility]  ORTHOSURG [address] [facility]  Scheduled Appointment: 11/17/2022    Lorenza Youssef  [facility]  ORTHOSUR[address]  Scheduled Appointment: 12/27/2022

## 2022-10-31 NOTE — DISCHARGE NOTE PROVIDER - PROVIDER TOKENS
PROVIDER:[TOKEN:[[id number]],SCHEDULEDAPPT:[11/17/2022],SCHEDULEDAPPTTIME:[09:30 AM],ESTABLISHEDPATIENT:[T]]

## 2022-10-31 NOTE — DISCHARGE NOTE PROVIDER - NSDCCPCAREPLAN_GEN_ALL_CORE_FT
PRINCIPAL DISCHARGE DIAGNOSIS  Diagnosis: Primary osteoarthritis of right hip  Assessment and Plan of Treatment: You have had an Anterior Total Hip Replacement. Follow instructions given to you by your surgeon.   PT/OT Total Hip Protocol; Ambulation, transfers, stairs, & ADLs  Full weight bearing both legs; Walker/cane use as instructed by PT/OT  Anterior THR precautions for 4 weeks: No straight leg raise; No external rotation of hip when extended-standing or lying flat; No hyperextension of hip when standing (kickback)  • Ice 20 minutes several times daily with at least a 20 minute break in between icing sessions  Daily dressing changes if incision is not completely dry.  If inicision is dry, it may be left open to air.  Keep incision clean. DO NOT APPLY ANYTHING to incision site (salves/ointments/creams).  May shower post-op if no drainage from incision.  Do not scrub incision site. Pat dry after shower.  Prineo tape removal on/near after Post Op Day # 14.  See PCP in office approximately 2-3 weeks from discharge for exam/labwork.

## 2022-10-31 NOTE — PROGRESS NOTE ADULT - ASSESSMENT
Discharge medication calendar:  ASA EC 81mg q12h x 4 weeks  Omeprazole 40mg QAM x 4 weeks (home med)  Meloxicam 15mg QAM x 2-3 weeks  APAP 1000mg q8h x 2-3 weeks  Narcotic PRN  Docusate 100mg TID while taking narcotic  Miralax, Senna, or Bisacodyl PRN for treatment of constipation

## 2022-10-31 NOTE — ASU PREOP CHECKLIST -  HIBICLENS SHOWER 2 DATE
Outpatient Medications Marked as Taking for the 11/8/21 encounter (Telephone) with Aram Gary MD   Medication Sig Dispense Refill   • acetaminophen (TYLENOL) 325 MG tablet Take 650 mg by mouth every 4 hours as needed for Pain.         No call back needed unless nurse has questions.     Pharmacy: fax   30-Oct-2022

## 2022-10-31 NOTE — CONSULT NOTE ADULT - ASSESSMENT
57F Hypothyroidism, GERD/PUD and OA admitted for aftercare following Right THR    S/P Right THR  POD 0    Continue Bowel and pain control regimen;    Incentive Spirometer for lung expansion;   Monitor Hgb and follow up electrolytes.      Hypothyroidism  Synthroid Daily    GERD/PUD  Protonix Daily    Diet  Regular    DVT Prophylaxis   Aspirin BID    Disposition  Discharge planning pending hospital course

## 2022-10-31 NOTE — ASU PATIENT PROFILE, ADULT - ANESTHESIA, PREVIOUS REACTION, PROFILE
Per PCP and Dr. Dot Myers, pt has chronic ischemic short bowel syndrome; this is ok to schedule as an OA Colonoscopy. nausea/vomiting

## 2022-10-31 NOTE — PHARMACOTHERAPY INTERVENTION NOTE - COMMENTS
Transition of Care video discharge education - medication calendar given to patient  
Admission medication reconciliation POD1

## 2022-10-31 NOTE — DISCHARGE NOTE PROVIDER - HOSPITAL COURSE
This patient was admitted to [facility] with a history of severe degenerative joint disease of the right hip.  Patient underwent Pre-Surgical Testing and was medically cleared to undergo elective procedure. Patient underwent Right anterior THR by Dr. Lorenza Youssef on 10/31/22. Procedure was well tolerated.  No operative or haylee-operative complications arose during patient's hospital course.  Patient received antibiotic according to SCIP guidelines for infection prevention.  Aspirin 81mg q 12 h was given for DVT prophylaxis, in addition to the use of SCDs.  Anesthesia, Medical Hospitalist, Physical Therapy and Occupational Therapy were consulted. Patient is stable for discharge with a good prognosis.  Appropriate discharge instructions and medications are provided in this document.

## 2022-10-31 NOTE — CONSULT NOTE ADULT - SUBJECTIVE AND OBJECTIVE BOX
HPI: 57F Hypothyroidism, GERD/PUD and OA has been combatting pain in right hip for several years which has progressively worsened.  Patient has tried multiple options for pain relief including OTC medication and as well as PT with minimal relief and has undergone elective replacement of right hip successfully.  Patient is currently resting in bed comfortable with good pain control.     REVIEW OF SYSTEMS:  CONSTITUTIONAL: No fever, weight loss, or fatigue  EYES: No eye pain, visual disturbances, or discharge  ENMT:  No difficulty hearing, tinnitus, vertigo; No sinus or throat pain  NECK: No pain or stiffness  RESPIRATORY: No cough, wheezing, chills or hemoptysis; No shortness of breath  CARDIOVASCULAR: No chest pain, palpitations, dizziness, or leg swelling  GASTROINTESTINAL: No abdominal or epigastric pain. No nausea, vomiting, or hematemesis; No diarrhea or constipation. No melena or hematochezia.  GENITOURINARY: No dysuria, frequency, hematuria, or incontinence  NEUROLOGICAL: No headaches, memory loss, loss of strength, numbness, or tremors  MUSCULOSKELETAL: No muscle or back pain      PAST MEDICAL & SURGICAL HISTORY:  GERD (gastroesophageal reflux disease)      History of hyperthyroidism  2002, treated with radioactive iodine      Osteoarthritis      History of COVID-19  March 2021, flu-like symptoms and no hospitalization      COVID-19 vaccine series completed      Peptic ulcer  2009      History of repair of ACL  left 2013      H/O arthroscopy of right knee  2020, MCL repair      History of tonsillectomy  age 30          SOCIAL HISTORY:  Tobacco; EtOH; Illicit Drugs    Allergies    Ceclor (Swelling; Urticaria; Pruritus; Hives)  latex (Rash)  sulfa drugs (Hives; Swelling; Urticaria; Pruritus)    Intolerances        MEDICATIONS  (STANDING):  lactated ringers. 1000 milliLiter(s) (75 mL/Hr) IV Continuous <Continuous>    MEDICATIONS  (PRN):  HYDROmorphone  Injectable 0.5 milliGRAM(s) IV Push every 10 minutes PRN Moderate Pain (4 - 6)  HYDROmorphone  Injectable 1 milliGRAM(s) IV Push every 10 minutes PRN Severe Pain (7 - 10)  ondansetron Injectable 4 milliGRAM(s) IV Push once PRN Nausea and/or Vomiting      FAMILY HISTORY:  Family history of type 2 diabetes mellitus (Father)        Vital Signs Last 24 Hrs  T(C): 36.5 (31 Oct 2022 09:27), Max: 36.6 (31 Oct 2022 07:03)  T(F): 97.7 (31 Oct 2022 09:27), Max: 97.8 (31 Oct 2022 07:03)  HR: 80 (31 Oct 2022 11:45) (56 - 80)  BP: 104/57 (31 Oct 2022 11:45) (103/54 - 129/64)  BP(mean): --  RR: 18 (31 Oct 2022 11:45) (13 - 20)  SpO2: 98% (31 Oct 2022 11:45) (98% - 100%)    Parameters below as of 31 Oct 2022 11:45  Patient On (Oxygen Delivery Method): room air        PHYSICAL EXAM:    GENERAL: NAD, well-developed  HEAD:  Atraumatic, Normocephalic  EYES: EOMI, PERRLA, conjunctiva and sclera clear  ENMT: No tonsillar erythema, exudates, or enlargement; Moist mucous membranes, Good dentition, No lesions  NECK: Supple, No JVD, Normal thyroid  NERVOUS SYSTEM:  Alert & Oriented X3, Good concentration;  CHEST/LUNG: Clear to auscultation bilaterally; No rales, rhonchi, wheezing, or rubs  HEART: Regular rate and rhythm; No murmurs, rubs, or gallops  ABDOMEN: Soft, Nontender, Nondistended; Bowel sounds present  EXTREMITIES:  2+ Peripheral Pulses, No clubbing, cyanosis, or edema      LABS:              CAPILLARY BLOOD GLUCOSE          RADIOLOGY & ADDITIONAL STUDIES:    EKG:   Personally Reviewed:  [ ] YES     Imaging:   Personally Reviewed:  [ ] YES     Consultant(s) Notes Reviewed:      Care Discussed with Consultants/Other Providers:

## 2022-10-31 NOTE — PHYSICAL THERAPY INITIAL EVALUATION ADULT - ADDITIONAL COMMENTS
Lives in house with significant other.  8 [address] with railing; pt does not have to go to 2nd level of home.  States she will stay on 1st level.  Owns cane and rolling walker was delivered.  Has both tub and stall shower.

## 2022-10-31 NOTE — DISCHARGE NOTE NURSING/CASE MANAGEMENT/SOCIAL WORK - PATIENT PORTAL LINK FT
You can access the FollowMyHealth Patient Portal offered by [facility] by registering at the following website: [email or web address]. By joining [facility]’s FollowMyHealth portal, you will also be able to view your health information using other applications (apps) compatible with our system.

## 2022-11-17 ENCOUNTER — APPOINTMENT (OUTPATIENT)
Dept: ORTHOPEDIC SURGERY | Facility: CLINIC | Age: 57
End: 2022-11-17

## 2022-11-17 VITALS — SYSTOLIC BLOOD PRESSURE: 103 MMHG | TEMPERATURE: 97.8 F | DIASTOLIC BLOOD PRESSURE: 68 MMHG | HEART RATE: 58 BPM

## 2022-11-17 PROCEDURE — 73502 X-RAY EXAM HIP UNI 2-3 VIEWS: CPT | Mod: RT

## 2022-11-17 PROCEDURE — 99024 POSTOP FOLLOW-UP VISIT: CPT

## 2022-11-17 RX ORDER — CLINDAMYCIN HYDROCHLORIDE 300 MG/1
300 CAPSULE ORAL
Qty: 10 | Refills: 2 | Status: ACTIVE | COMMUNITY
Start: 2022-11-17 | End: 1900-01-01

## 2022-12-27 ENCOUNTER — APPOINTMENT (OUTPATIENT)
Dept: ORTHOPEDIC SURGERY | Facility: CLINIC | Age: 57
End: 2022-12-27

## 2022-12-27 VITALS — SYSTOLIC BLOOD PRESSURE: 113 MMHG | DIASTOLIC BLOOD PRESSURE: 75 MMHG | TEMPERATURE: 98.2 F | HEART RATE: 57 BPM

## 2022-12-27 DIAGNOSIS — Z96.641 PRESENCE OF RIGHT ARTIFICIAL HIP JOINT: ICD-10-CM

## 2022-12-27 PROCEDURE — 73502 X-RAY EXAM HIP UNI 2-3 VIEWS: CPT

## 2022-12-27 PROCEDURE — 99024 POSTOP FOLLOW-UP VISIT: CPT

## 2023-01-01 NOTE — ASU PATIENT PROFILE, ADULT - CAREGIVER ADDRESS
[address] [facility] [id number] [place]: sees adequately in most situations; can see medication labels, newsprint

## 2023-01-03 ENCOUNTER — RX RENEWAL (OUTPATIENT)
Age: 58
End: 2023-01-03

## 2023-01-03 RX ORDER — MELOXICAM 15 MG/1
15 TABLET ORAL DAILY
Qty: 30 | Refills: 0 | Status: ACTIVE | COMMUNITY
Start: 2022-12-02 | End: 1900-01-01

## 2023-09-21 ASSESSMENT — HOOS JR
WALKING ON UNEVEN SURFACE: SEVERE
LYING IN BED (TURNING OVER, MAINTAINING HIP POSITION): SEVERE
HOOS JR RAW SCORE: 13
IMPORTED HOOS JR SCORE: 13.0
GOING UP OR DOWN STAIRS: MODERATE
IMPORTED HOOS JR SCORE: 15.0
RISING FROM SITTING: MODERATE
HOOS JR RAW SCORE: 15
WALKING ON UNEVEN SURFACE: MODERATE
GOING UP OR DOWN STAIRS: SEVERE
IMPORTED LATERALITY: RIGHT
BENDING TO THE FLOOR TO PICK UP OBJECT: MODERATE
IMPORTED FORM: YES
SITTING: MODERATE

## 2024-09-30 ENCOUNTER — NON-APPOINTMENT (OUTPATIENT)
Age: 59
End: 2024-09-30

## (undated) DEVICE — SOL IRR POUR H2O 1500ML

## (undated) DEVICE — DRAPE IOBAN 10X5"

## (undated) DEVICE — DRAPE TOP SHEET 53"X101"

## (undated) DEVICE — DRAPE MAYO STAND 30"

## (undated) DEVICE — POSITIONER POSITIONING ROLL  5X17"

## (undated) DEVICE — BLANKET WARMER UPPER ADULT

## (undated) DEVICE — DRSG STOCKINETTE TUBULAR COTTON 2PLY 6X72"

## (undated) DEVICE — SOL IRR POUR NS 0.9% 500ML

## (undated) DEVICE — HOOD FLYTE STRYKER HELMET SHIELD

## (undated) DEVICE — SUT VICRYL PLUS 1 27" CP UNDYED

## (undated) DEVICE — ELCTR ROCKER SWITCH PENCIL TELESCOPING 10FT

## (undated) DEVICE — PACK TOTAL HIP CUST

## (undated) DEVICE — DRAPE SHEET XL 77X98"

## (undated) DEVICE — SYR LUER LOK 50CC

## (undated) DEVICE — DRAPE C ARM 41X140"

## (undated) DEVICE — SOL IRR BAG NS 0.9% 3000ML

## (undated) DEVICE — NDL SPINAL 18G X 3.5"

## (undated) DEVICE — SYM-STRYKER SYSTEM 7: Type: DURABLE MEDICAL EQUIPMENT

## (undated) DEVICE — SUT DERMABOND PRINEO 60CM

## (undated) DEVICE — SAW BLADE STRYKER SAGITTAL AGGRESSIVE 25X86.5X1.32MM

## (undated) DEVICE — DRAPE BILATERAL LIMB 72X120"

## (undated) DEVICE — SEALER BIPOLAR 6.0 AQUAMANTYS

## (undated) DEVICE — DRSG STOCKINETTE IMPERVIOUS XL

## (undated) DEVICE — DRSG COBAN 6"

## (undated) DEVICE — IRRISEPT JET LAVAGE W 0.05 PCT CHG

## (undated) DEVICE — WRAP COMPRESSION CALF MED